# Patient Record
Sex: MALE | Race: WHITE | NOT HISPANIC OR LATINO | Employment: FULL TIME | URBAN - METROPOLITAN AREA
[De-identification: names, ages, dates, MRNs, and addresses within clinical notes are randomized per-mention and may not be internally consistent; named-entity substitution may affect disease eponyms.]

---

## 2023-01-27 ENCOUNTER — TELEPHONE (OUTPATIENT)
Dept: ORTHOPEDICS | Facility: CLINIC | Age: 68
End: 2023-01-27
Payer: COMMERCIAL

## 2023-01-27 NOTE — TELEPHONE ENCOUNTER
----- Message from Bhaarti Milligan sent at 1/24/2023  2:08 PM CST -----  Regarding: NEW REFERRAL  Ladies,     Received a new referral , everything has been completed in uploaded in .    -Patient

## 2023-02-13 ENCOUNTER — DOCUMENTATION ONLY (OUTPATIENT)
Dept: ALLERGY | Facility: CLINIC | Age: 68
End: 2023-02-13
Payer: COMMERCIAL

## 2023-03-08 ENCOUNTER — TELEPHONE (OUTPATIENT)
Dept: ORTHOPEDICS | Facility: CLINIC | Age: 68
End: 2023-03-08
Payer: COMMERCIAL

## 2023-03-08 NOTE — TELEPHONE ENCOUNTER
Tj returned my call. I discussed with him the surgeons have reviewed his images and records.  Offered to get additional xrays.   He said he would go back to his ortho.  I can see he has pcp appt next week - can send xray request to them if he would like.  States that provider is no longer in network.  Has an appt with a different pcp tomorrow.  Offered to send to them.  States he may do this.  Will follow up with ortho at home about having scope done.

## 2023-03-20 ENCOUNTER — TELEPHONE (OUTPATIENT)
Dept: ORTHOPEDICS | Facility: CLINIC | Age: 68
End: 2023-03-20
Payer: COMMERCIAL

## 2023-03-20 NOTE — TELEPHONE ENCOUNTER
Pt left voicemail that he had xrays done through pcp Dr. Chiquita Webster (ph [phone]) attempted to call their office - unable to speak with someone.  Left voicemail with details that I was calling to find out what xrays were done as our MD wanted specific imaging done.   My direct call back number was left on voicemail.          Of note, desired imaging was: standing PA flex view

## 2023-03-24 ENCOUNTER — TELEPHONE (OUTPATIENT)
Dept: ORTHOPEDICS | Facility: CLINIC | Age: 68
End: 2023-03-24
Payer: COMMERCIAL

## 2023-03-24 NOTE — TELEPHONE ENCOUNTER
Attempted to reach Dr. Webster's office again on 3/22 and 3/24.  Left voicemail with call back number and reason for call on 3/24

## 2023-03-27 ENCOUNTER — PATIENT MESSAGE (OUTPATIENT)
Dept: ORTHOPEDICS | Facility: CLINIC | Age: 68
End: 2023-03-27
Payer: COMMERCIAL

## 2023-05-22 ENCOUNTER — TELEPHONE (OUTPATIENT)
Dept: ORTHOPEDICS | Facility: CLINIC | Age: 68
End: 2023-05-22
Payer: COMMERCIAL